# Patient Record
Sex: FEMALE | Race: WHITE | NOT HISPANIC OR LATINO | Employment: FULL TIME | URBAN - NONMETROPOLITAN AREA
[De-identification: names, ages, dates, MRNs, and addresses within clinical notes are randomized per-mention and may not be internally consistent; named-entity substitution may affect disease eponyms.]

---

## 2025-02-15 ENCOUNTER — OFFICE VISIT (OUTPATIENT)
Dept: URGENT CARE | Facility: URGENT CARE | Age: 60
End: 2025-02-15
Payer: COMMERCIAL

## 2025-02-15 VITALS
DIASTOLIC BLOOD PRESSURE: 74 MMHG | WEIGHT: 170.42 LBS | BODY MASS INDEX: 27.52 KG/M2 | OXYGEN SATURATION: 99 % | HEART RATE: 90 BPM | TEMPERATURE: 98.2 F | SYSTOLIC BLOOD PRESSURE: 103 MMHG | RESPIRATION RATE: 20 BRPM

## 2025-02-15 DIAGNOSIS — J20.9 BRONCHITIS WITH BRONCHOSPASM: Primary | ICD-10-CM

## 2025-02-15 DIAGNOSIS — R09.89 SUSPECTED NOVEL INFLUENZA A VIRUS INFECTION: ICD-10-CM

## 2025-02-15 DIAGNOSIS — J10.1 INFLUENZA A: ICD-10-CM

## 2025-02-15 LAB
POC RAPID INFLUENZA A: POSITIVE
POC RAPID INFLUENZA B: NEGATIVE

## 2025-02-15 RX ORDER — INHALER, ASSIST DEVICES
SPACER (EA) MISCELLANEOUS
Qty: 1 EACH | Refills: 0 | Status: SHIPPED | OUTPATIENT
Start: 2025-02-15 | End: 2026-02-15

## 2025-02-15 RX ORDER — PRAVASTATIN SODIUM 40 MG/1
TABLET ORAL
COMMUNITY
Start: 2025-01-15

## 2025-02-15 RX ORDER — LISINOPRIL 10 MG/1
TABLET ORAL
COMMUNITY
Start: 2025-01-15

## 2025-02-15 RX ORDER — ALBUTEROL SULFATE 0.83 MG/ML
2.5 SOLUTION RESPIRATORY (INHALATION) ONCE
Status: COMPLETED | OUTPATIENT
Start: 2025-02-15 | End: 2025-02-15

## 2025-02-15 RX ORDER — ALBUTEROL SULFATE 90 UG/1
2 INHALANT RESPIRATORY (INHALATION) EVERY 6 HOURS PRN
Qty: 8.5 G | Refills: 0 | Status: SHIPPED | OUTPATIENT
Start: 2025-02-15 | End: 2025-03-17

## 2025-02-15 RX ADMIN — ALBUTEROL SULFATE 2.5 MG: 0.83 SOLUTION RESPIRATORY (INHALATION) at 08:56

## 2025-02-15 NOTE — PATIENT INSTRUCTIONS
Acute bronchospasm with bronchitis- Rapid Influenza A positive. Not a candidate for Tamiflu with onset of symptoms >48 hours. Albuterol neb trialed, Preneb O2 96%, Post neb O2. Advised to continue Albuterol inhaler 2 puffs every 6 hr with spacer as needed for spasmatic cough or wheeze.   Recommend warm salt water gargles, saline nasal spray every 2 hours as needed congestion, Humidifier, Vicks Chest RUB, OTC expectorant such as Robitussin or Mucinex  with plenty of fluids, Hydration with Pedialyte or water, Trial of Flonase nasal spray 2 sprays each nostril daily or 1 spray twice a day x 3-7 days and zyrtec 10mg daily x 1 week  Return if not improving in 3-5 days or worsening ear pain.   Go to ER if chest pain or difficulty breathing or dizziness or fever 103 with headache or neck stiffness.

## 2025-02-15 NOTE — PROGRESS NOTES
Subjective   Patient ID: Curtis Alonso is a 59 y.o. female present today with a chief complaint of Illness (Sinus pressure, ear pain, slight cough, chills earlier this week, achy, weak x3 days ).    History of Present Illness    Illness    Pt reports no fever only chills on Wednesday night 3 days ago. Also with dry cough, no wheeze or dyspnea. No vomiting. Pt tried alkaseltzer, nasal spray and rescue inhaler from last year. No hx of asthma, hx of wheeze last April with bronchitis and wheeze.  FH of sister with asthma. Pt is former smoker.     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    No past surgical history on file.     reports that she has never smoked. She has never used smokeless tobacco.    Review of Systems  Review of Systems                               Objective    Vitals:    02/15/25 0829 02/15/25 0906   BP: 103/74    BP Location: Left arm    Patient Position: Sitting    Pulse: 90 90   Resp: 20 20   Temp: 36.8 °C (98.2 °F)    TempSrc: Oral    SpO2: 96% 99%   Weight: 77.3 kg (170 lb 6.7 oz)      No LMP recorded (lmp unknown). Patient is postmenopausal.    Physical Exam  Constitutional:       Comments: Tired appearing   HENT:      Head: Normocephalic and atraumatic.      Right Ear: Tympanic membrane normal.      Left Ear: Tympanic membrane normal.      Ears:      Comments: Retracted left TM     Nose: Congestion and rhinorrhea present.      Mouth/Throat:      Mouth: Mucous membranes are moist.      Pharynx: Posterior oropharyngeal erythema (severe cobbling) present. No oropharyngeal exudate.   Eyes:      Extraocular Movements: Extraocular movements intact.      Conjunctiva/sclera: Conjunctivae normal.      Pupils: Pupils are equal, round, and reactive to light.   Cardiovascular:       Rate and Rhythm: Normal rate and regular rhythm.      Heart sounds: No murmur heard.  Pulmonary:      Effort: Pulmonary effort is normal. No respiratory distress.      Breath sounds: No wheezing.      Comments: Mildly diminished breath sounds  Musculoskeletal:         General: Normal range of motion.      Cervical back: Normal range of motion and neck supple.   Lymphadenopathy:      Cervical: No cervical adenopathy.   Skin:     General: Skin is warm and dry.   Neurological:      General: No focal deficit present.      Mental Status: She is alert.         Procedures    Point of Care Test & Imaging Results from this visit  Results for orders placed or performed in visit on 02/15/25   POCT Influenza A/B manually resulted   Result Value Ref Range    POC Rapid Influenza A Positive (A) Negative    POC Rapid Influenza B Negative Negative      No results found.    Diagnostic study results (if any) were reviewed by Melinda Zhang PA-C.    Assessment/Plan   Allergies, medications, history, and pertinent labs/EKGs/Imaging reviewed by Melinda Zhang PA-C.     Medical Decision Making  59 y.o. female present today with a chief complaint of Illness (Sinus pressure, ear pain, slight cough, chills earlier this week, achy, weak x3 days ).  Reviewed vitals stable. Exam remarkable for retracted left TM, nasal congestion, rhinorrhea, pharyngeal erythema without exudate, mildly diminished breath sounds without respiratory distress    Acute bronchospasm with bronchitis- Rapid Influenza A positive. Not a candidate for Tamiflu with onset of symptoms >48 hours. Albuterol neb trialed, Preneb O2 96%, Post neb O2. Advised to continue Albuterol inhaler 2 puffs every 6 hr with spacer as needed for spasmatic cough or wheeze.   Recommend warm salt water gargles, saline nasal spray every 2 hours as needed congestion, Humidifier, Vicks Chest RUB, OTC expectorant such as Robitussin or Mucinex  with plenty of fluids, Hydration with  Pedialyte or water, Trial of Flonase nasal spray 2 sprays each nostril daily or 1 spray twice a day x 3-7 days and zyrtec 10mg daily x 1 week  Return if not improving in 3-5 days or worsening ear pain.   Go to ER if chest pain or difficulty breathing or dizziness or fever 103 with headache or neck stiffness.    Orders and Diagnoses  Diagnoses and all orders for this visit:  Bronchitis with bronchospasm  -     albuterol (ProAir HFA) 90 mcg/actuation inhaler; Inhale 2 puffs every 6 hours if needed for wheezing or shortness of breath. Use with spacer  -     inhalational spacing device (BreatheRite MDI Spacer) inhaler; Use as instructed with albuterol inhaler  -     albuterol 2.5 mg /3 mL (0.083 %) nebulizer solution 2.5 mg  Suspected novel influenza A virus infection  -     POCT Influenza A/B manually resulted  Influenza A      Medical Admin Record  Administrations This Visit       albuterol 2.5 mg /3 mL (0.083 %) nebulizer solution 2.5 mg       Admin Date  02/15/2025 Action  Given Dose  2.5 mg Route  nebulization Documented By  Rhonda Muhammad MA                    Patient disposition: Home    Electronically signed by Melinda Zhang PA-C  7:37 PM